# Patient Record
(demographics unavailable — no encounter records)

---

## 2025-02-25 NOTE — PHYSICAL EXAM
[Chaperone Present] : A chaperone was present in the examining room during all aspects of the physical examination [Appropriately responsive] : appropriately responsive [Alert] : alert [No Acute Distress] : no acute distress [No Lymphadenopathy] : no lymphadenopathy [Soft] : soft [Non-tender] : non-tender [Non-distended] : non-distended [No HSM] : No HSM [No Lesions] : no lesions [No Mass] : no mass [Oriented x3] : oriented x3 [Examination Of The Breasts] : a normal appearance [No Masses] : no breast masses were palpable [Labia Majora] : normal [Labia Minora] : normal [Normal] : normal [Uterine Adnexae] : normal [FreeTextEntry2] : Bonny

## 2025-02-25 NOTE — END OF VISIT
[FreeTextEntry3] : I Danial Munoz, acted as a scribe on behalf of Dr. Wali Amezquita on 02/24/2025.  All medical entries made by this scribe where at my Dr. Wali Amezquita, direction and personally dictated by me on 02/24/2025.  I have reviewed the chart and agree that the record accurately reflects my personal performance of the history, physical exam, assessment, and plan. I have also personally directed, reviewed and agreed with the chart.

## 2025-02-25 NOTE — PLAN
[FreeTextEntry1] : 75 yo for annual visit.   HCM -pap smear today -rx mammo and sono -colonoscopy up to date -rto 1 year

## 2025-03-27 NOTE — PHYSICAL EXAM
[No Acute Distress] : no acute distress [Well Nourished] : well nourished [PERRL] : pupils equal round and reactive to light [Normal Oropharynx] : the oropharynx was normal [No Lymphadenopathy] : no lymphadenopathy [Supple] : supple [Thyroid Normal, No Nodules] : the thyroid was normal and there were no nodules present [No Accessory Muscle Use] : no accessory muscle use [Clear to Auscultation] : lungs were clear to auscultation bilaterally [Regular Rhythm] : with a regular rhythm [Normal S1, S2] : normal S1 and S2 [No Edema] : there was no peripheral edema [Soft] : abdomen soft [Non Tender] : non-tender [Non-distended] : non-distended [No Masses] : no abdominal mass palpated [No HSM] : no HSM [Normal Bowel Sounds] : normal bowel sounds [Normal Affect] : the affect was normal [Normal Insight/Judgement] : insight and judgment were intact [de-identified] : minimally impacted cerumen in R. ear; normal L. TM [de-identified] : II/VI systolic murmur [de-identified] : deferred to gyn

## 2025-03-27 NOTE — HEALTH RISK ASSESSMENT
[Good] : ~his/her~  mood as  good [No falls in past year] : Patient reported no falls in the past year [Little interest or pleasure doing things] : 1) Little interest or pleasure doing things [Feeling down, depressed, or hopeless] : 2) Feeling down, depressed, or hopeless [0] : 2) Feeling down, depressed, or hopeless: Not at all (0) [[id number]] :  0 [Patient reported mammogram was normal] : Patient reported mammogram was normal [Patient reported PAP Smear was normal] : Patient reported PAP Smear was normal [Patient reported bone density results were abnormal] : Patient reported bone density results were abnormal [Patient reported colonoscopy was normal] : Patient reported colonoscopy was normal [MammogramDate] : 6/24 [PapSmearDate] : 2/25 [BoneDensityDate] : 3/25 [BoneDensityComments] : osteopenia [ColonoscopyDate] : 3/23

## 2025-03-27 NOTE — HISTORY OF PRESENT ILLNESS
[FreeTextEntry1] :  cpe  [de-identified] : diet: good exercise: walking  in interval - saw GYN - had bone density which shows osteopenia

## 2025-03-27 NOTE — ASSESSMENT
[FreeTextEntry1] :  This is a 74-year-old female with HLD, mild AoStenosis (TTE 9/2017) not seen on exam in 2018, seasonal allergies, R. 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    [facility] - d/w pt fasting physical BW   ecg performed during wellness exam to monitor for any cardiac condition - - nsr no acute changes from ekg done on 12/21/18   UTD with optho exam (q 3 months)   advised repeat screening colonoscopy -5/23 - due for repeat 2026  advised bone density - pt defer  UTD with gyn ( last saw gynecology 2/28) - goes every 2 years  UTD with screening mammogram & b/l US  states got prevnar 20 last year 2023  advised Tdap  got RSV  UTD with FBSE with derm  advised routine cardiology   rtc in 6-12 months

## 2025-03-31 NOTE — HISTORY OF PRESENT ILLNESS
[FreeTextEntry1] : Beatriz has a longstanding history of heart murmur; echocardiography in 2018 showed mild mitral insufficiency and sclerosis of the aortic valve leaflets.  She takes atorvastatin for management of her cholesterol levels.  She has no history of hypertension or diabetes.  As I see her today, she remains active without limitation.  She describes no cardiac sxs. --  Beatriz reports no episodes of exertional chest discomfort and no VEGA.  She describes no palpitations; there have been no episodes of lightheadedness or loss of consciousness.  She describes no orthopnea or PND.   There is no family history of premature heart disease.  She has never been a cigarette smoker.  She has no history of hypertension or diabetes.

## 2025-03-31 NOTE — PHYSICAL EXAM
[General Appearance - Well Developed] : well developed [Normal Appearance] : normal appearance [Well Groomed] : well groomed [General Appearance - Well Nourished] : well nourished [General Appearance - In No Acute Distress] : no acute distress [Normal Conjunctiva] : the conjunctiva exhibited no abnormalities [Eyelids - No Xanthelasma] : the eyelids demonstrated no xanthelasmas [Normal Jugular Venous A Waves Present] : normal jugular venous A waves present [Normal Jugular Venous V Waves Present] : normal jugular venous V waves present [No Jugular Venous Esquivel A Waves] : no jugular venous esquivel A waves [Respiration, Rhythm And Depth] : normal respiratory rhythm and effort [Auscultation Breath Sounds / Voice Sounds] : lungs were clear to auscultation bilaterally [Heart Rate And Rhythm] : heart rate and rhythm were normal [Bowel Sounds] : normal bowel sounds [Abnormal Walk] : normal gait [Gait - Sufficient For Exercise Testing] : the gait was sufficient for exercise testing [Nail Clubbing] : no clubbing of the fingernails [Cyanosis, Localized] : no localized cyanosis [Petechial Hemorrhages (___cm)] : no petechial hemorrhages [Skin Color & Pigmentation] : normal skin color and pigmentation [] : no rash [No Venous Stasis] : no venous stasis [Oriented To Time, Place, And Person] : oriented to person, place, and time [Impaired Insight] : insight and judgment were intact [Affect] : the affect was normal [Mood] : the mood was normal [FreeTextEntry1] : 2+ pulses in the upper and lower extremities.  No edema.

## 2025-03-31 NOTE — ASSESSMENT
[FreeTextEntry1] : ==================== Hyperlipemia  Mild aortic stenosis  Mild mitral insufficiency  Diastolic dysfunction

## 2025-03-31 NOTE — DISCUSSION/SUMMARY
[EKG obtained to assist in diagnosis and management of assessed problem(s)] : EKG obtained to assist in diagnosis and management of assessed problem(s) [FreeTextEntry1] : EKG today shows:  Sinus Rhythm at 80 bpm.  Normal intervals.  Axis - 15.  Unremarkable tracing; no significant change.  PLAN 1.  Hyperlipemia - reviewed blood work from earlier this month.  Lipid profile appears to be in reasonable range.  She will continue current dose of atorvastatin.  2.  Hx. of mild MR and mild AS.  Systolic murmurs audible on exam.  -   Will have her return for an echocardiogram to reassess valvular function.  The last echocardiogram was done in 2018.  3.   BP is acceptable today.     4.  When she returns, we will also arrange for a carotid Doppler.  Soft bruit is audible over the carotids although this could be a transmitted murmur from the base of the heart.  If carotid disease is present, 1 would consider using a higher dose of atorvastatin aiming for an LDL less than 70.   I asked her to return in a few weeks for a follow-up visit with echocardiogram.

## 2025-03-31 NOTE — REASON FOR VISIT
[FreeTextEntry1] :   Beatriz Anne presents regarding HLD and hx. of murmur (MR and aortic valve sclerosis seen in past).

## 2025-05-06 NOTE — REASON FOR VISIT
[FreeTextEntry1] :   Beatriz Anne returns for f/u re HLD and hx. of murmur (MR and aortic valve sclerosis seen in past).

## 2025-05-06 NOTE — HISTORY OF PRESENT ILLNESS
[FreeTextEntry1] : 3/31/25: Beatriz has a longstanding history of heart murmur; echocardiography in 2018 showed mild mitral insufficiency and sclerosis of the aortic valve leaflets.  She takes atorvastatin for management of her cholesterol levels.  She has no history of hypertension or diabetes. As I see her today, she remains active without limitation.  She describes no cardiac sxs. --  Beatriz reports no episodes of exertional chest discomfort and no VEGA.  She describes no palpitations; there have been no episodes of lightheadedness or loss of consciousness.  She describes no orthopnea or PND.  There is no family history of premature heart disease.  She has never been a cigarette smoker.  She has no history of hypertension or diabetes.  5/6/25: Patient remains active and well.  She continues her usual activities and describes no cardiac symptoms.  There have been no episodes of exertional chest discomfort or exertional dyspnea.  Beatriz reports no palpitations.  She describes no episodes of lightheadedness/LOC. There have been no episodes of orthopnea or PND. Medications are unchanged. There have been no new interval medical problems.

## 2025-05-06 NOTE — DISCUSSION/SUMMARY
[FreeTextEntry1] : EKG today shows:  Sinus Rhythm at 70 bpm.  Normal intervals and axis.  Unremarkable tracing; no significant change.  Echo today shows: 1. Normal left and right atrial size. 2. Left ventricular systolic function is normal with an ejection fraction of 73 % by Sousa's method of disks. 3. There is mild (grade 1) left ventricular diastolic dysfunction. 4. Normal right ventricular systolic function. 5. There is calcification of the aortic valve leaflets. No aortic stenosis or regurgitation. 6. There is mild calcification of the mitral valve annulus. Mild mitral stenosis and mild mitral regurgitation. 7. Very mild tricuspid regurgitation. 8. Overall, no significant change compared to an echocardiogram done in 2018.   PLAN 1.  Hyperlipemia -  Lipid profile in March was in reasonable range.   -   continue current dose of atorvastatin; continue low fat diet.  2.  Echo today shows mild MR; sclerosis of aortic valve leaflets is seen but no A.S. present on Doppler exam.  3.   BP remains acceptable on exam.  30 minutes spent on today's office visit.   She will return in six months; will arrange a carotid Doppler at that time. [EKG obtained to assist in diagnosis and management of assessed problem(s)] : EKG obtained to assist in diagnosis and management of assessed problem(s)